# Patient Record
(demographics unavailable — no encounter records)

---

## 2017-09-04 NOTE — ER DOCUMENT REPORT
ED Pediatric Illness





- General


Mode of Arrival: Carried


Information source: Parent


TRAVEL OUTSIDE OF THE [place] IN LAST 30 DAYS: No





- HPI


Onset: Other - Refer to HPI Notes





- General


Chief Complaint: Swallowed Foreign Body


Stated Complaint: POSSIBLE INGESTION OF FOREIGN OBJECT


Time Seen by Provider: 09/04/17 20:54





- HPI


Notes: 





Patient is a 1 year and 3-month-old male presenting to the emergency department 

after ingesting poke weed berries.  Patient was with his 2 brothers running 

outside and playing in the yard.  Mother states that they were chasing the dog 

in the backyard and she was in the front yard with their younger sister.  

Mother states that she found them eating berries.  Mother states that this 

patient was eating from a cluster and had several in his mouth. This occurred 

at around 20:00. Patient has vomited x1 since ingesting the berries. Patient's 

vaccinations are up to date and has no pertinent medical history.


PCP [facility] [place] (ERICK LEON)





- Related Data


Allergies/Adverse Reactions: 


 





No Known Allergies Allergy (Unverified 09/04/17 20:39)


 











Past Medical History





- General


Information source: Parent





- Social History


Smoking Status: Never Smoker


Cigarette use (# per day): No


Chew tobacco use (# tins/day): No


Smoking Education Provided: No


Frequency of alcohol use: None


Drug Abuse: None


Family History: None


Patient has suicidal ideation: No


Patient has homicidal ideation: No





- Medical History


Medical History: Negative


Surgical Hx: Negative





- Immunizations


Immunizations up to date: Yes





Review of Systems





- Review of Systems


Constitutional: No symptoms reported


EENT: No symptoms reported


Cardiovascular: No symptoms reported


Respiratory: No symptoms reported


Gastrointestinal: See HPI, Vomiting, Other - ingestion


Genitourinary: No symptoms reported


Male Genitourinary: No symptoms reported


Musculoskeletal: No symptoms reported


Skin: No symptoms reported


Hematologic/Lymphatic: No symptoms reported


Neurological/Psychological: No symptoms reported


-: Yes All other systems reviewed and negative





Physical Exam





- Vital signs


Interpretation: Normal





- Vital signs


Vitals: 


 











Temp Pulse Resp BP Pulse Ox


 


 97.6 F   121   26   132/85   100 


 


 09/04/17 20:35  09/04/17 20:35  09/04/17 20:35  09/04/17 20:35  09/04/17 20:35














- Notes


Notes: 


GENERAL: Alert, interacts appropriately for age, cries, consolable. No acute 

distress.


HEAD: Normocephalic, atraumatic.


EYES: Appear normal. Pupils equal, round, and reactive to light.


ENT: Moist mucus membranes, tongue midline, normal oropharynx with no signs of 

berries or blood.


NECK: Full range of motion. Supple. Trachea midline.


LUNGS: Clear to auscultation bilaterally, no wheezes, rales, or rhonchi. No 

respiratory distress.


HEART: Regular rate and rhythm. No murmurs, gallops, or rubs. Rate varies 

appropriately with respirations.


ABDOMEN: Soft, non-tender. Non-distended. Normal bowel sounds.


EXTREMITIES: Moves all 4 extremities spontaneously. Normal strength.


NEUROLOGICAL:  No focal neurological deficits. GCS 15.


PSYCH: Age appropriate behavior.


SKIN: Warm, dry, normal turgor. Few superficial abrasions to the face. (ERICK LEON)





Course





- Consults


  ** Poison control


Time consulted: 21:05





- Re-evaluation


Re-evalutation: 








09/04/17 22:12


Reevaluated patient at this time.  Patient is crying but columns upon entry to 

the room.  Patient's heart rate is elevated but normalizes after he stops 

crying.  Patient was able to drink apple juice with no difficulty. (ERICK LEON)





09/04/17 21:20


Discussed case with poison control, they state that by now I would have seen 

vital sign abnormalities, patient's only need to be spotcheck rather than on 

continuous telemetry.  Out of an abundance of caution I am going to leave the 

patient's on telemetry for the next hour anyway.  Poison control states that we 

can try giving them food and water, so long as there are vitals remained stable 

and they are able to tolerate water they may be discharged after approximately 

3 hours of observation which would be midnight.  Mother is agreeable to this 

plan.


09/04/17 23:34


 (DENITA VACA)





- Vital Signs


Vital signs: 


 











Temp Pulse Resp BP Pulse Ox


 


 97.6 F   115   27   89/59   100 


 


 09/04/17 20:35  09/04/17 23:00  09/04/17 23:00  09/04/17 23:00  09/04/17 23:00














Discharge





- Discharge


Clinical Impression: 


Pokeweed poisoning


Qualifiers:


 Encounter type: initial encounter Injury intent: accidental or unintentional 

Qualified Code(s): T62.2X1A - Toxic effect of other ingested (parts of) plant(s)

, accidental (unintentional), initial encounter





Condition: Stable


Disposition: HOME, SELF-CARE


Additional Instructions: 


Your child may have some mild vomiting or diarrhea throughout the rest of the 

night, if it persists beyond tomorrow morning or if it become severe where they 

cannot tolerate any food or liquid please return to the emergency department.


Referrals: 


BRYAN SAMSON MD [Primary Care Provider] - Follow up as needed


Scribe Attestation: 





09/04/17 23:36


I personally performed the services described in the documentation, reviewed 

and edited the documentation which was dictated to the scribe in my presence, 

and it accurately records my words and actions. (DENITA VACA)





Scribe Documentation





- Scribe


Written by Tobias:: Tobias Pedro 9/4/2017 22:21


acting as scribe for :: Syeda

## 2017-09-14 NOTE — ER DOCUMENT REPORT
ED Skin Rash/Insect Bite/Abscs





- General


Chief Complaint: Rash


Stated Complaint: SKIN PROBLEM


Time Seen by Provider: 09/14/17 16:43


Mode of Arrival: Ambulatory


Information source: Parent


Notes: 





16-month-old male presents to ED for rash all over his body.  Dad states that 

he has the rash for about 2 days.  Dad states his immunizations are up-to-date.

  He is denied any fever.  Patient has rash to the soles of feet the palms of 

the hand he also has ulcers to the back of his throat.  Child was very dirty 

with soiled clothes.


TRAVEL OUTSIDE OF THE [place] IN LAST 30 DAYS: No





- HPI


Patient complains to provider of: Skin rash/lesion


Onset: Other - 2 days


Onset/Duration: Gradual


Quality of pain: No pain - Dad states patient has not had any pain just severe 

itching


Severity: None


Pain Level: Denies


Skin Character: Rash


Quality of rash: Itchy


Identify cause: Yes


Exacerbated by: Denies


Relieved by: Denies


Similar symptoms previously: No


Recently seen / treated by doctor: No





- Related Data


Allergies/Adverse Reactions: 


 





No Known Allergies Allergy (Verified 09/14/17 15:59)


 











Past Medical History





- General


Information source: Parent





- Social History


Smoking Status: Never Smoker


Cigarette use (# per day): No


Chew tobacco use (# tins/day): No


Smoking Education Provided: No


Frequency of alcohol use: None


Drug Abuse: None


Lives with: Family


Family History: None


Patient has suicidal ideation: No


Patient has homicidal ideation: No





- Past Medical History


Cardiac Medical History: Reports: None


Pulmonary Medical History: Reports: None


EENT Medical History: Reports: None


Neurological Medical History: Reports: None


Endocrine Medical History: Reports: None


Renal/ Medical History: Reports: None


Malignancy Medical History: Reports None


GI Medical History: Reports: None


Musculoskeltal Medical History: Reports None


Skin Medical History: Reports None


Psychiatric Medical History: Reports: None


Traumatic Medical History: Reports: None


Infectious Medical History: Reports: None


Surgical Hx: Negative





- Immunizations


Immunizations up to date: Yes


Hx Diphtheria, Pertussis, Tetanus Vaccination: Yes





Review of Systems





- Review of Systems


Constitutional: No symptoms reported


EENT: No symptoms reported


Cardiovascular: No symptoms reported


Respiratory: No symptoms reported


Gastrointestinal: No symptoms reported


Genitourinary: No symptoms reported


Male Genitourinary: No symptoms reported


Musculoskeletal: No symptoms reported


Skin: Rash


Hematologic/Lymphatic: No symptoms reported


Neurological/Psychological: No symptoms reported


-: Yes All other systems reviewed and negative





Physical Exam





- Vital signs


Vitals: 


 











Temp Pulse Resp BP Pulse Ox


 


 98.5 F   111   32   120/80   98 


 


 09/14/17 15:54  09/14/17 15:54  09/14/17 15:54  09/14/17 15:54  09/14/17 15:54











Interpretation: Normal





- General


General appearance: Appears well, Alert


General appearance pediatric: Attentiveness normal, Good eye contact





- HEENT


Head: Normocephalic, Atraumatic


Eyes: Normal


Pupils: PERRL


Ears: Normal


External canal: Normal


Tympanic membrane: Normal


Sinus: Normal


Nasal: Clear rhinorrhea


Mouth/Lips: Lesions - yellow with red halo


Pharynx: Other - Lesions to the back of the throat


Neck: Normal





- Respiratory


Respiratory status: No respiratory distress


Chest status: Nontender


Breath sounds: Normal


Chest palpation: Normal





- Cardiovascular


Rhythm: Regular


Heart sounds: Normal auscultation


Murmur: No





- Abdominal


Inspection: Normal


Distension: No distension


Bowel sounds: Normal


Tenderness: Nontender


Organomegaly: No organomegaly





- Back


Back: Normal, Nontender





- Extremities


General upper extremity: Normal inspection, Nontender, Normal color, Normal ROM

, Normal temperature


General lower extremity: Normal inspection, Nontender, Normal color, Normal ROM

, Normal temperature, Normal weight bearing.  No: Isabella's sign





- Neurological


Neuro grossly intact: Yes


Cognition: Normal


Orientation: AAOx4


Ped Nebo Coma Scale Eye Opening: Spontaneous


Ped Nebo Coma Scale Verbal: Age appropriate verbal


Ped Lucinda Coma Scale Motor: Spontaneous Movements


Pediatric Lucinda Coma Scale Total: 15


Speech: Normal


Motor strength normal: LUE, RUE, LLE, RLE


Sensory: Normal





- Psychological


Associated symptoms: Normal affect, Normal mood





- Skin


Skin Temperature: Warm


Skin Moisture: Dry


Skin Color: Normal


Location of irregularity: Generalized - To include the soles of the feet the 

palms of the hands worse around the diaper area.  Most of the rash has been 

scratched but no signs of infection no redness drainage or inflammation.


Character of irregularity: Vesicular - many have been scratched





Course





- Vital Signs


Vital signs: 


 











Temp Pulse Resp BP Pulse Ox


 


 98.5 F   111   32   120/80   98 


 


 09/14/17 15:54  09/14/17 15:54  09/14/17 15:54  09/14/17 15:54  09/14/17 15:54














Discharge





- Discharge


Clinical Impression: 


 Hand, foot and mouth disease





Condition: Stable


Disposition: HOME, SELF-CARE


Instructions:  Pediatricians


Additional Instructions: 


Hand, Foot and Mouth Disease





     Hand, Foot, and Mouth Disease (HFM) is caused by a virus. Symptoms include 

small ulcers in the mouth and spots or blisters on the palms, feet, or 

buttocks. A low grade fever for 2-3 days is common. The skin and mouth sores 

may last for 7-10 days. Hand, Foot, and Mouth Disease is contagious until one 

day after the fever is gone.


     Most of the time, symptoms are mild. If fluids are avoided due to painful 

mouth sores, dehydration may result. You can use oral anesthetics (Oragel, 

Anbesol) or liquid Benadryl to numb mouth sores. Use acetaminophen for pain and 

fever. Use cool liquids and foods that are easily chewed. Avoid citrus juices 

and spicy foods.


     To prevent spread of the virus, use good handwashing. Shared toys should 

be cleaned with disinfectant. Clean the toilets, sinks, and counter surfaces in 

bathrooms. Launder clothing in hot water.


     Return if there is a significant change for the worse, including high fever

, severe pain, or dehydration. Signs of dehydration in a child can include 

progressive weakness, apathy, irritability, or no diaper wetting for over eight 

hours.





Please keep the skin very clean to prevent the rash from getting infected. 


 Please change the diaper frequently to decrease the diaper rash.  


Cool baths until the rash is gone to prevent an increase in itching to the 

rash.  You were provided with at Magic mouthwash which will help with the pain 

in the mouth and happy honey cream which will help with the diaper rash.





Use Benadryl or Caladryl lotion to the rash to help decrease the itching





Pediatric Ibuprofen





     Ibuprofen (Pediaprofen, Children's Motrin, Advil Suspension) is an 

excellent, safe drug for fever and pain control.  It is a welcome addition to 

the medicines available for the treatment of fever, especially in children as 

it comes in a liquid and is easily tolerated by children.  It has 

antiinflammatory effects which may be beneficial.


     Ibuprofen can be given every six to eight hours, for a total of four doses 

daily.  The following are maximum recommended dosages:


Age                   Weight                  <102.5 F                >102.5 F


                       lbs       kg              (5 mg/kg)                (10 mg

/kg) 


6-11 mos        13-17   6-7.9         1/4 tsp (25 mg)        1/2 tsp (50 mg)


12-23 mos     18-23   8-10.9         1/2 tsp (50 mg)        1 tsp (100 mg)


2-3 yrs          24-35   11-15.9        3/4 tsp (75 mg)      1 1/2tsp (150 mg)


4-5 yrs          36-47   16-21.9        1 tsp (100 mg)           2 tsp (200 mg)


6-8 yrs          48-59   22-26.9      1 1/4 tsp (125 mg)    2 1/2 tsp (250 mg)


9-10 yrs         60-71   27-31.9     1 1/2 tsp (150 mg)      3 tsp (300 mg)


11-12 yrs       72-95   32-43.9        2 tsp (200 mg)         4 tsp (400 mg)


ADULT                                                                      4 

tsp (400 mg)





Acetaminophen





     Acetaminophen may be taken for pain relief or fever control. It's much 

safer than aspirin, offering a wider range of "safe" dosages.  It is safe 

during pregnancy.  Some brand names are Tylenol, Panadol, Datril, Anacin 3, 

Tempra, and Liquiprin. Acetaminophen can be repeated every four hours.  The 

following are maximum recommended dosages:





WEIGHT         Dose             Drops                  Elixir        Chewable(

80mg)


(LBS.)                            drprs=droppers    tsp=teaspoon


6                 40 mg            .4 ml (1/2)


6-11            80 mg            .8 ml (full)            1/2   tsp           1 

      tab


12-16         120 mg           1 1/2 drprs            3/4   tsp           1 1/2

  tabs


17-23         160 mg             2  drprs              1      tsp           2  

     tabs


24-30         240 mg             3  drprs              1 1/2 tsp           3   

    tabs


30-35         320 mg                                     2       tsp           

4       tabs


36-41         360 mg                                     2 1/4 tsp           4 1

/2  tabs


42-47         400 mg                                     2 1/2 tsp           5 

      tabs


48-53         480 mg                                     3       tsp          6

       tabs


54-59         520 mg                                     3  1/4 tsp          6 1

/2 tabs


60-64         560 mg                                     3  1/2 tsp          7 

     tabs 


65-70         600 mg                                     3  3/4 tsp          7 1

/2 tabs


71-76         640 mg                                     4       tsp           

8      tabs


77-82         720 mg                                     4 1/2  tsp           9

      tabs


83-88         800 mg                                     5       tsp           

10     tabs





>89 pounds or adults          650 mg to 900 mg 





Acetaminophen can be repeated every four hours. Maximum daily dose not to 

exceed 4000 mg.





   These maximum recommended dosages are slightly higher than the dosages 

written on the product container, but these dosages are very safe and well 

below the toxic dosage for acetaminophen.











FOLLOW-UP CARE:.Follow-up with your primary doctor in the next 24-48 hours or 

as soon as you can get an appointment.


If you have been referred to a physician for follow-up care, call the physician

s office for an appointment as you were instructed or within the next two days.

  If you experience worsening or a significant change in your symptoms, notify 

the physician immediately or return to the Emergency Department at any time for 

re-evaluation


Prescriptions: 


Miscellaneous Medication [Happy Hiney Cream] 1 applic TOP ASDIR PRN #30 gm


 PRN Reason: 


Nystatin/Dexameth/Diphen [Magic Mouthwash (Omh Formula) Susp] 5 ml PO QID #120 

ml


Referrals: 


HARPREET WINTERS MD [Primary Care Provider] - Follow up as needed

## 2017-11-19 NOTE — ER DOCUMENT REPORT
HPI





- HPI


Patient complains to provider of: fever, cough, congestion


Pain Level: 2


Context: 





Patient is a 1 year 6-month-old male comes emergency department for chief 

complaint of almost 3 days of nasal congestion, cough, fever.  Patient also has 

a sick sibling.  No rapid or labored breathing.  Patient has coughing episodes 

which are worse at time.  Mom states he is eating and drinking normally, acting 

normally otherwise.  He is vaccinated, takes no daily medications, no past 

medical history reported.





Past Medical History





- General


Information source: Parent





- Social History


Smoking Status: Never Smoker


Frequency of alcohol use: None


Drug Abuse: None


Lives with: Family


Family History: None





- Medical History


Medical History: Negative


Renal/ Medical History: Denies: Hx Peritoneal Dialysis


Surgical Hx: Negative





- Immunizations


Immunizations up to date: Yes


Hx Diphtheria, Pertussis, Tetanus Vaccination: Yes





[facility] Provider Document





- INFECTION CONTROL


TRAVEL OUTSIDE OF THE [place] IN LAST 30 DAYS: No





- HEENT


HEENT: Atraumatic, Normocephalic.  negative: Normal ENT Exam - Nasal congestion

, mild rhinorrhea, otherwise unremarkable with normal ears and pharyngeal exam





- NECK


Neck: Normal Inspection





- RESPIRATORY


Respiratory: Breath Sounds Normal, No Respiratory Distress.  negative: Rales, 

Rhonchi, Wheezing


O2 Sat by Pulse Oximetry: 97





- CARDIOVASCULAR


Cardiovascular: Regular Rate, Regular Rhythm





- GI/ABDOMEN


Gastrointestinal: Abdomen Soft, Abdomen Non-Tender





- MUSCULOSKELETAL/EXTREMETIES


Musculoskeletal/Extremeties: MAEW, FROM, Non-Tender





- NEURO


Level of Consciousness: Awake, Alert, Appropriate


Motor/Sensory: No Motor Deficit, No Sensory Deficit





Course





- Re-evaluation


Re-evalutation: 


Patient is smiling, alert, interactive, well-appearing.  Clear lungs, mild 

nasal congestion on exam, unremarkable ENT and physical exam otherwise.  

Consistent with viral illness with also having sick sibling.  No spiking fever, 

concerning respiratory symptoms, or ill appearance suggesting underlying 

pneumonia.  Discussed with mom.  Patient will be treated with Tylenol for fever

, continue current home remedies, stay hydrated, follow-up with pediatrics, and 

return if he worsens including rapid or labored breathing, spiking fever, or if 

he stops acting normally.  Mom states understanding and agreement.





- Vital Signs


Vital signs: 


 











Temp Pulse Resp BP Pulse Ox


 


 100.8 F H  144 H     135/90   97 


 


 11/19/17 00:09  11/19/17 00:09     11/19/17 00:09  11/19/17 00:09














Discharge





- Discharge


Clinical Impression: 


Fever


Qualifiers:


 Fever type: unspecified Qualified Code(s): R50.9 - Fever, unspecified





Upper respiratory infection


Qualifiers:


 URI type: unspecified URI Qualified Code(s): J06.9 - Acute upper respiratory 

infection, unspecified





Condition: Stable


Disposition: HOME, SELF-CARE


Instructions:  Acetaminophen


Additional Instructions: 


His examination is very suggestive of a virus with no concerning abnormalities.


Continue Tylenol for fever, give plenty of fluids, follow-up with pediatrics 

for additional monitoring and management.


Return to the emergency department for any concerning or worsening symptoms 

including rapid or labored breathing, fever that will not respond to medication

, if he stops responding to you normally, or any other concerning symptoms.


Referrals: 


HARPREET WINTERS MD [Primary Care Provider] - Follow up as needed